# Patient Record
Sex: MALE | Race: BLACK OR AFRICAN AMERICAN | NOT HISPANIC OR LATINO | Employment: OTHER | URBAN - METROPOLITAN AREA
[De-identification: names, ages, dates, MRNs, and addresses within clinical notes are randomized per-mention and may not be internally consistent; named-entity substitution may affect disease eponyms.]

---

## 2024-05-11 ENCOUNTER — APPOINTMENT (OUTPATIENT)
Dept: RADIOLOGY | Facility: HOSPITAL | Age: 84
End: 2024-05-11
Payer: MEDICAID

## 2024-05-11 ENCOUNTER — HOSPITAL ENCOUNTER (EMERGENCY)
Facility: HOSPITAL | Age: 84
Discharge: NURSING, REHAB OR LONG-TERM CARE | End: 2024-05-12
Attending: EMERGENCY MEDICINE
Payer: MEDICAID
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
## 2024-05-11 PROCEDURE — 71045 X-RAY EXAM CHEST 1 VIEW: CPT

## 2024-05-11 PROCEDURE — 99285 EMERGENCY DEPT VISIT HI MDM: CPT | Performed by: EMERGENCY MEDICINE

## 2024-05-11 PROCEDURE — 71045 X-RAY EXAM CHEST 1 VIEW: CPT | Performed by: STUDENT IN AN ORGANIZED HEALTH CARE EDUCATION/TRAINING PROGRAM

## 2024-05-11 ASSESSMENT — COLUMBIA-SUICIDE SEVERITY RATING SCALE - C-SSRS
1. IN THE PAST MONTH, HAVE YOU WISHED YOU WERE DEAD OR WISHED YOU COULD GO TO SLEEP AND NOT WAKE UP?: NO
6. HAVE YOU EVER DONE ANYTHING, STARTED TO DO ANYTHING, OR PREPARED TO DO ANYTHING TO END YOUR LIFE?: NO
2. HAVE YOU ACTUALLY HAD ANY THOUGHTS OF KILLING YOURSELF?: NO

## 2024-05-12 ENCOUNTER — APPOINTMENT (OUTPATIENT)
Dept: RADIOLOGY | Facility: HOSPITAL | Age: 84
End: 2024-05-12
Payer: MEDICAID

## 2024-05-12 VITALS
OXYGEN SATURATION: 98 % | DIASTOLIC BLOOD PRESSURE: 84 MMHG | HEART RATE: 54 BPM | SYSTOLIC BLOOD PRESSURE: 144 MMHG | TEMPERATURE: 97.6 F | RESPIRATION RATE: 18 BRPM

## 2024-05-12 PROCEDURE — 70450 CT HEAD/BRAIN W/O DYE: CPT

## 2024-05-12 PROCEDURE — 72125 CT NECK SPINE W/O DYE: CPT | Performed by: RADIOLOGY

## 2024-05-12 PROCEDURE — 70450 CT HEAD/BRAIN W/O DYE: CPT | Performed by: RADIOLOGY

## 2024-05-12 PROCEDURE — 72125 CT NECK SPINE W/O DYE: CPT

## 2024-05-12 ASSESSMENT — PAIN SCALES - GENERAL: PAINLEVEL_OUTOF10: 0 - NO PAIN

## 2024-05-12 ASSESSMENT — PAIN - FUNCTIONAL ASSESSMENT: PAIN_FUNCTIONAL_ASSESSMENT: 0-10

## 2024-05-12 NOTE — DISCHARGE INSTRUCTIONS
You were seen in the emergency department after you accidentally fell out of your wheelchair.  Your imaging did not show any evidence of any significant bleeding in your brain or fractures. There is slight asymmetry of the CSF space, possibly what is called a hygroma. This does not need urgent evaluation but follow up with your primary doctor.

## 2024-05-12 NOTE — ED PROVIDER NOTES
CC: Fall     HPI: Tye Decker is an 84-year-old male with history including prior nephrectomy, CKD, anemia, hypertension, glaucoma, blindness in left eye, dementia presenting to the emergency department from his nursing facility for evaluation after he fell from his wheelchair. No LOC. Is not on anticoagulation. The patient denies any symptoms and states he feels fine. Per report from the facility he is mentating at his baseline - A&Ox1.     Limitations to History: confusion  Additional History Obtained from: EMR/paperwork    PMHx/PSHx:  Per HPI.   - has a past medical history of Personal history of diseases of the blood and blood-forming organs and certain disorders involving the immune mechanism, Personal history of other diseases of male genital organs, Personal history of other diseases of the circulatory system, Personal history of other diseases of the musculoskeletal system and connective tissue, and Personal history of other diseases of the nervous system and sense organs.  - has no past surgical history on file.    Social History:  - Tobacco:  has no history on file for tobacco use.   - Alcohol:  has no history on file for alcohol use.   - Drugs:  has no history on file for drug use.     Medications: Reviewed in EMR.     Allergies:  Cigarette smoke, Oxycodone, and Poison ivy    ???????????????????????????????????????????????????????????????  Triage Vitals:  T 36.7 °C (98.1 °F)  HR 68  /88  RR 16  O2 95 % None (Room air)    Physical Exam   Gen: awake, well-appearing, no distress  Eyes: no conjunctival injection or scleral icterus, pupils equal, left eye blindness (baseline), extraocular movements grossly intact  ENT: nares patent, moist mucous membranes  Neck: supple, trachea midline, no masses  Heart: regular rate and rhythm, audible heart sounds  Lungs: clear to auscultation bilaterally, no increased work of breathing  Abdomen: soft, nondistended, nontender, no guarding or rebound  Back: no  spinal tenderness  Extremities: well-perfused, no edema, no tenderness to palpation, 2+ distal pulses  Neuro: alert, oriented to self but answers basic questions, no facial asymmetry, speech fluent, moving all extremities equally and against gravity, sensation intact     ???????????????????????????????????????????????????????????????  ED Course/Medical Decision Makin-year-old male with history including prior nephrectomy, CKD, anemia, hypertension, glaucoma, blindness in left eye, dementia presenting to the emergency department from his nursing facility for evaluation after he fell from his wheelchair. He's mentating at baseline and exam is unremarkable. However, given his fall and unreliable history, CT head/C-spine and chest x-ray obtained. See ED course below.    ED Course as of 24   Sun May 12, 2024   0200 I reviewed CT imaging and this shows no evidence of an acute intracranial process or C-spine fracture.  There is slight asymmetry of the CSF space, possibly hygroma per radiology. [LM]   0200 He remains overall stable.  I do not feel that further workup is indicated at this time.  Instruction to follow-up with his primary physician were provided to his SNF. Given return precautions.  [LM]      ED Course User Index  [LM] Michelle Malloy MD         Diagnoses as of 24   Fall, initial encounter       External records reviewed: recent inpatient, clinic, and prior ED notes  Diagnostic imaging independently reviewed/interpreted by me (as reflected in MDM) includes: imaging  Discussion of management with other providers: n/a  Prescription Drug Consideration: n/a  Escalation of Care: outpatient follow up appropriate    Disposition: discharge back to SNF      Procedures ? [employer] last updated 2024 3:23 AM        Michelle Malloy MD  24       Michelle Malloy MD  24

## 2024-05-12 NOTE — ED TRIAGE NOTES
Pt arrived by EMS from SNF for a fall out of wheelchair. Pt is not on blood thinners and there was no LOC. Pt is baseline A&Ox1. Pt having no complaints of pain. Nurse at [facility] reports she was concerned that he has unequal pupils. EMS states he has had [facility] the whole time he was with them. Pt has hx of blindness in left eye. Pt at baseline.